# Patient Record
Sex: FEMALE | Race: WHITE | Employment: FULL TIME | URBAN - METROPOLITAN AREA
[De-identification: names, ages, dates, MRNs, and addresses within clinical notes are randomized per-mention and may not be internally consistent; named-entity substitution may affect disease eponyms.]

---

## 2017-04-06 ENCOUNTER — OFFICE VISIT (OUTPATIENT)
Dept: NEUROLOGY | Age: 42
End: 2017-04-06

## 2017-04-06 VITALS
HEART RATE: 78 BPM | OXYGEN SATURATION: 99 % | SYSTOLIC BLOOD PRESSURE: 110 MMHG | WEIGHT: 116 LBS | DIASTOLIC BLOOD PRESSURE: 80 MMHG

## 2017-04-06 DIAGNOSIS — G40.019 PARTIAL IDIOPATHIC EPILEPSY WITH SEIZURES OF LOCALIZED ONSET, INTRACTABLE, WITHOUT STATUS EPILEPTICUS (HCC): Primary | ICD-10-CM

## 2017-04-06 DIAGNOSIS — R40.4 TRANSIENT ALTERATION OF AWARENESS: ICD-10-CM

## 2017-04-06 RX ORDER — CLONAZEPAM 0.5 MG/1
0.25 TABLET ORAL
Qty: 15 TAB | Refills: 2 | Status: SHIPPED | OUTPATIENT
Start: 2017-04-06

## 2017-04-06 NOTE — MR AVS SNAPSHOT
Visit Information Date & Time Provider Department Dept. Phone Encounter #  
 4/6/2017  3:00 PM Tiff Cagle MD [facility] at [facility] [phone] [id number] Follow-up Instructions Return in about 3 months (around 7/6/2017). Upcoming Health Maintenance Date Due DTaP/Tdap/Td series (1 - Tdap) 11/11/1998 PAP AKA CERVICAL CYTOLOGY 11/11/1998 INFLUENZA AGE 9 TO ADULT 8/1/2016 Allergies as of 4/6/2017  Review Complete On: 4/6/2017 By: Tiff Cagle MD  
  
 Severity Noted Reaction Type Reactions Aspirin  04/06/2017    Other (comments) Shallow breathing Current Immunizations  Never Reviewed No immunizations on file. Not reviewed this visit You Were Diagnosed With   
  
 Codes Comments Partial idiopathic epilepsy with seizures of localized onset, intractable, without status epilepticus ([facility] [address]    -  Primary ICD-10-CM: G40.019 ICD-9-CM: 345.51 Transient alteration of awareness     ICD-10-CM: R40.4 ICD-9-CM: 780.02 Vitals BP Pulse Weight(growth percentile) SpO2 Smoking Status 110/80 78 116 lb (52.6 kg) 99% Never Assessed Preferred Pharmacy Pharmacy Name Phone [address] [place] [address], [address] [place] [address] Felisha Harp Said [phone] Your Updated Medication List  
  
Notice  As of 4/6/2017  3:28 PM  
 You have not been prescribed any medications. Follow-up Instructions Return in about 3 months (around 7/6/2017). To-Do List   
 04/07/2017 Neurology:  EEG 24 HR HOLTER AMB NEURO Patient Instructions A Healthy Lifestyle: Care Instructions Your Care Instructions A healthy lifestyle can help you feel good, stay at a healthy weight, and have plenty of energy for both work and play. A healthy lifestyle is something you can share with your whole family.  
A healthy lifestyle also can lower your risk for serious health problems, such as high blood pressure, heart disease, and diabetes. You can follow a few steps listed below to improve your health and the health of your family. Follow-up care is a key part of your treatment and safety. Be sure to make and go to all appointments, and call your doctor if you are having problems. Its also a good idea to know your test results and keep a list of the medicines you take. How can you care for yourself at home? · Do not eat too much sugar, fat, or fast foods. You can still have dessert and treats now and then. The goal is moderation. · Start small to improve your eating habits. Pay attention to portion sizes, drink less juice and soda pop, and eat more fruits and vegetables. ¨ Eat a healthy amount of food. A 3-ounce serving of meat, for example, is about the size of a deck of cards. Fill the rest of your plate with vegetables and whole grains. ¨ Limit the amount of soda and sports drinks you have every day. Drink more water when you are thirsty. ¨ Eat at least 5 servings of fruits and vegetables every day. It may seem like a lot, but it is not hard to reach this goal. A serving or helping is 1 piece of fruit, 1 cup of vegetables, or 2 cups of leafy, raw vegetables. Have an apple or some carrot sticks as an afternoon snack instead of a candy bar. Try to have fruits and/or vegetables at every meal. 
· Make exercise part of your daily routine. You may want to start with simple activities, such as walking, bicycling, or slow swimming. Try to be active 30 to 60 minutes every day. You do not need to do all 30 to 60 minutes all at once. For example, you can exercise 3 times a day for 10 or 20 minutes. Moderate exercise is safe for most people, but it is always a good idea to talk to your doctor before starting an exercise program. 
· Keep moving. Susu Jacobsenbs the lawn, work in the garden, or [employer]. Take the stairs instead of the elevator at work. · If you smoke, quit. People who smoke have an increased risk for heart attack, stroke, cancer, and other lung illnesses. Quitting is hard, but there are ways to boost your chance of quitting tobacco for good. ¨ Use nicotine gum, patches, or lozenges. ¨ Ask your doctor about stop-smoking programs and medicines. ¨ Keep trying. In addition to reducing your risk of diseases in the future, you will notice some benefits soon after you stop using tobacco. If you have shortness of breath or asthma symptoms, they will likely get better within a few weeks after you quit. · Limit how much alcohol you drink. Moderate amounts of alcohol (up to 2 drinks a day for men, 1 drink a day for women) are okay. But drinking too much can lead to liver problems, high blood pressure, and other health problems. Family health If you have a family, there are many things you can do together to improve your health. · Eat meals together as a family as often as possible. · Eat healthy foods. This includes fruits, vegetables, lean meats and dairy, and whole grains. · Include your family in your fitness plan. Most people think of activities such as jogging or tennis as the way to fitness, but there are many ways you and your family can be more active. Anything that makes you breathe hard and gets your heart pumping is exercise. Here are some tips: 
¨ Walk to do errands or to take your child to school or the bus. ¨ Go for a family bike ride after dinner instead of watching TV. Where can you learn more? Go to http://marvin-gurjit.info/. Enter T699 in the search box to learn more about \"A Healthy Lifestyle: Care Instructions. \" Current as of: July 26, 2016 Content Version: 11.2 © 9859-8199 [employer]. Care instructions adapted under license by [employer] (which disclaims liability or warranty for this information).  If you have questions about a medical condition or this instruction, always ask your healthcare professional. [place] [address] any warranty or liability for your use of this information. Introducing [facility]! Mitchel Caballero introduces [employer] patient portal. Now you can access parts of your medical record, email your doctor's office, and request medication refills online. 1. In your internet browser, go to [email or web address] 2. Click on the First Time User? Click Here link in the Sign In box. You will see the New Member Sign Up page. 3. Enter your [employer] Access Code exactly as it appears below. You will not need to use this code after youve completed the sign-up process. If you do not sign up before the expiration date, you must request a new code. · [employer] Access Code: [address] Expires: 7/5/2017  2:23 PM 
 
4. Enter the last four digits of your Social Security Number (xxxx) and Date of Birth (mm/dd/yyyy) as indicated and click Submit. You will be taken to the next sign-up page. 5. Create a [employer] ID. This will be your [employer] login ID and cannot be changed, so think of one that is secure and easy to remember. 6. Create a [employer] password. You can change your password at any time. 7. Enter your Password Reset Question and Answer. This can be used at a later time if you forget your password. 8. Enter your e-mail address. You will receive e-mail notification when new information is available in [address] 9. Click Sign Up. You can now view and download portions of your medical record. 10. Click the Download Summary menu link to download a portable copy of your medical information. If you have questions, please visit the Frequently Asked Questions section of the [employer] website. Remember, [employer] is NOT to be used for urgent needs. For medical emergencies, dial 911. Now available from your iPhone and Android! Please provide this summary of care documentation to your next provider. Your primary care clinician is listed as Winifred Jenkins. If you have any questions after today's visit, please call [phone].

## 2017-04-06 NOTE — PATIENT INSTRUCTIONS

## 2017-04-06 NOTE — LETTER
NOTIFICATION RETURN TO WORK / SCHOOL 
 
4/6/2017 3:44 PM 
 
Ms. Keke Sofia [address] [place] [address] To Whom It May Concern: 
 
Keke Sofia is currently under the care of [address] [place] [address]. She will return to work/school on: 04/10/2017 If there are questions or concerns please have the patient contact our office. Sincerely, Rober Osullivan MD
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
## 2017-04-20 ENCOUNTER — TELEPHONE (OUTPATIENT)
Dept: NEUROLOGY | Age: 42
End: 2017-04-20

## 2017-04-20 NOTE — TELEPHONE ENCOUNTER
Spoke with patient and advised we received her paperwork that she is requesting to be complete by Dr. Marina Mccracken, she stated that she understands that he is on call this week.  However she needs something stating that on letter head to be faxed to [phone]    -Faxed letter with information to [phone]

## 2017-05-04 ENCOUNTER — HOSPITAL ENCOUNTER (OUTPATIENT)
Dept: NEUROLOGY | Age: 42
Discharge: HOME | End: 2017-05-04
Attending: PSYCHIATRY & NEUROLOGY
Payer: SELF-PAY

## 2017-05-04 DIAGNOSIS — R40.4 TRANSIENT ALTERATION OF AWARENESS: ICD-10-CM

## 2017-05-04 DIAGNOSIS — G40.019 PARTIAL IDIOPATHIC EPILEPSY WITH SEIZURES OF LOCALIZED ONSET, INTRACTABLE, WITHOUT STATUS EPILEPTICUS (HCC): ICD-10-CM

## 2017-05-04 PROCEDURE — 95953 NEURO EEG 24 HR: CPT | Performed by: PSYCHIATRY & NEUROLOGY

## 2017-05-23 ENCOUNTER — TELEPHONE (OUTPATIENT)
Dept: NEUROLOGY | Age: 42
End: 2017-05-23

## 2017-05-23 NOTE — TELEPHONE ENCOUNTER
----- Message from Doron Mcdonald sent at 5/23/2017  1:40 PM EDT -----  Regarding: Dr. Ya Mccray stated she would like a call from the nurse regarding test results and share some additional information about her health. Best contact number [phone].

## 2017-05-24 ENCOUNTER — TELEPHONE (OUTPATIENT)
Dept: NEUROLOGY | Age: 42
End: 2017-05-24

## 2017-05-24 NOTE — TELEPHONE ENCOUNTER
----- Message from Kim Alonzo sent at 5/24/2017 11:49 AM EDT -----  Regarding: Dr Cornelius Rivera  Pt's (p) [phone],KN is  Following up on message left yesterday regarding wanting to know if her test results were back yet.

## 2017-05-25 NOTE — TELEPHONE ENCOUNTER
Spoke with the patient Per Dr. Myrla Halsted the eeg was normal    -patient stated that she is still having convlusion; asked if she could come in to see Dr. Myrla Halsted a06/07 at 3:20pm     Patient took the appointment

## 2017-05-30 NOTE — PROCEDURES
[address] [facility] [address] [place] [address], [address]   EEG       Name:  Leslie Luna   MR#:  [id number]   :  1975   Account #:  [de-identified]      Date of Adm:  2017       DATE OF STUDY: 2017     EEG ID number [id number]    HISTORY: This is a 23 hours 38 minutes and 57 seconds study. DESCRIPTION OF PROCEDURE: Electrodes applied in accordance   with International 10-20 system electrode placement. EEG was   reviewed in both bipolar and referential montages. DESCRIPTION OF FINDINGS: Background consists of symmetric 10   Hz activity attenuated with drowsiness . Approximately 9 hours of   sleep were recorded. Several events were submitted by the patient   including tics, and spontaneous leg movements. No overt ictal activity were   noted during these events. No clear signs or intellectual hallmarks   of epilepsy are noted on the study. IMPRESSION: This electroencephalogram appears to be normal showing   no ictal activty in the readable portions of the study. During sleep myogenic   most resembling clenching was noted. No clear signs of epilepsy were noted on the   study. The absence of seizures on the study does not exclude the   diagnosis of epilepsy.         MD Julieth Vera / SAMMI   D:  2017   06:16   T:  2017   06:35   Job #:  [id number]

## 2017-05-30 NOTE — TELEPHONE ENCOUNTER
Already spoke with the patient per the note on 05/25    Spoke with the patient Per Dr. Madai Randolph the eeg was normal     -patient stated that she is still having convlusion; asked if she could come in to see Dr. Madai Randolph a06/07 at 3:20pm      Patient took the appointment       Electronically signed by Marielena Sparks at 05/25/17 [id number]

## 2024-07-15 ENCOUNTER — NEW PATIENT (OUTPATIENT)
Dept: URBAN - METROPOLITAN AREA CLINIC 58 | Facility: CLINIC | Age: 49
End: 2024-07-15

## 2024-07-15 DIAGNOSIS — Q85.00: ICD-10-CM

## 2024-07-15 DIAGNOSIS — H43.823: ICD-10-CM

## 2024-07-15 PROCEDURE — 99204 OFFICE O/P NEW MOD 45 MIN: CPT

## 2024-07-15 PROCEDURE — 92134 CPTRZ OPH DX IMG PST SGM RTA: CPT

## 2024-07-15 PROCEDURE — 92133 CPTRZD OPH DX IMG PST SGM ON: CPT

## 2024-07-15 PROCEDURE — 92202 OPSCPY EXTND ON/MAC DRAW: CPT

## 2024-07-15 ASSESSMENT — TONOMETRY
OD_IOP_MMHG: 16
OS_IOP_MMHG: 16

## 2024-07-15 ASSESSMENT — VISUAL ACUITY
OD_SC: 20/25-1
OS_SC: 20/25